# Patient Record
Sex: FEMALE | Race: WHITE | NOT HISPANIC OR LATINO | Employment: OTHER | URBAN - NONMETROPOLITAN AREA
[De-identification: names, ages, dates, MRNs, and addresses within clinical notes are randomized per-mention and may not be internally consistent; named-entity substitution may affect disease eponyms.]

---

## 2017-01-16 ENCOUNTER — OFFICE VISIT (OUTPATIENT)
Dept: FAMILY MEDICINE CLINIC | Facility: CLINIC | Age: 52
End: 2017-01-16

## 2017-01-16 VITALS
DIASTOLIC BLOOD PRESSURE: 80 MMHG | HEART RATE: 85 BPM | BODY MASS INDEX: 33.67 KG/M2 | SYSTOLIC BLOOD PRESSURE: 120 MMHG | OXYGEN SATURATION: 97 % | WEIGHT: 167 LBS | HEIGHT: 59 IN

## 2017-01-16 DIAGNOSIS — E11.9 TYPE 2 DIABETES MELLITUS WITHOUT COMPLICATION, WITHOUT LONG-TERM CURRENT USE OF INSULIN (HCC): Primary | ICD-10-CM

## 2017-01-16 DIAGNOSIS — R05.9 COUGH: ICD-10-CM

## 2017-01-16 DIAGNOSIS — S99.922A TOE INJURY, LEFT, INITIAL ENCOUNTER: ICD-10-CM

## 2017-01-16 LAB — HBA1C MFR BLD: 5.9 %

## 2017-01-16 PROCEDURE — 83036 HEMOGLOBIN GLYCOSYLATED A1C: CPT | Performed by: FAMILY MEDICINE

## 2017-01-16 PROCEDURE — 99214 OFFICE O/P EST MOD 30 MIN: CPT | Performed by: FAMILY MEDICINE

## 2017-01-16 RX ORDER — PROMETHAZINE HYDROCHLORIDE AND CODEINE PHOSPHATE 6.25; 1 MG/5ML; MG/5ML
5 SYRUP ORAL EVERY 4 HOURS PRN
Qty: 180 ML | Refills: 1
Start: 2017-01-16 | End: 2017-04-17

## 2017-01-16 NOTE — PROGRESS NOTES
Subjective   Key Webster is a 51 y.o. female.     History of Present Illness   51-year-old female.  Diabetic.  Here for 3 month follow-up visit.  Through the holidays her weights been pretty steady.  Her A1c in the past was 6.6, then she dropped it to 5.8.  At her last visit her metformin was dropped from 500 twice a day to 500 mg one half twice a day.  Her blood pressure was excellent and she was having some cough and her lisinopril was held.  She does take atorvastatin inconsistently.    Cough.  Just starting.  Some head congestion.  No fever.    Injury to her toe.  Left foot middle toe.  She would like for me to examine.  The following portions of the patient's history were reviewed and updated as appropriate: allergies, current medications, past family history, past medical history, past social history, past surgical history and problem list.    Review of Systems   Constitutional: Negative for activity change and unexpected weight change.   HENT: Positive for congestion.    Eyes: Negative for itching.   Respiratory: Positive for cough.    Cardiovascular: Negative for chest pain.   Musculoskeletal: Positive for arthralgias (in the middle toe of the left foot.).       Objective   Physical Exam   Constitutional: She appears well-nourished.   HENT:   Head: Normocephalic.   Neck: Neck supple.   Cardiovascular: Normal rate and regular rhythm.    Pulmonary/Chest: Effort normal and breath sounds normal.            Assessment/Plan   Key was seen today for diabetes.    Diagnoses and all orders for this visit:    Type 2 diabetes mellitus without complication, without long-term current use of insulin  -     POC Glycosylated Hemoglobin (Hb A1C)    Cough  -     promethazine-codeine (PHENERGAN with CODEINE) 6.25-10 MG/5ML syrup; Take 5 mL by mouth Every 4 (Four) Hours As Needed for cough.    Toe injury, left, initial encounter      Diabetes.  A1c is 59.  Overall I am pleased.  Continue metformin 500 mg one half twice a day.   Repeat A1c in 3 months.  Off off lisinopril.  Blood pressure still looks fine.  For the time being we'll not add losartan.    Cough.  Exam unremarkable.  Early respiratory infection.  Viral.  I did give her a cough medicine upon request.    Toe injury.  I doubt if were dealing with a fracture.  Everything is very straight.  I showed her how to karthik tape it.  No x-rays indicated at this time.

## 2017-01-16 NOTE — MR AVS SNAPSHOT
Key Webster   1/16/2017 4:00 PM   Office Visit    Dept Phone:  [phone]   Encounter #:  [id number]    Provider:  Lorenzo Tadeo MD   Department:  [facility]                Your Full Care Plan              Today's Medication Changes          These changes are accurate as of: 1/16/17  4:25 PM.  If you have any questions, ask your nurse or doctor.               New Medication(s)Ordered:     promethazine-codeine 6.25-10 MG/5ML syrup   Commonly known as:  PHENERGAN with CODEINE   Take 5 mL by mouth Every 4 (Four) Hours As Needed for cough.   Started by:  Lorenzo Tadeo MD            Where to Get Your Medications      Information about where to get these medications is not yet available     ! Ask your nurse or doctor about these medications     promethazine-codeine 6.25-10 MG/5ML syrup                  Your Updated Medication List          This list is accurate as of: 1/16/17  4:25 PM.  Always use your most recent med list.                atorvastatin 10 MG tablet   Commonly known as:  LIPITOR   Take 1 tablet by mouth daily.       lisinopril 10 MG tablet   Commonly known as:  PRINIVIL,ZESTRIL   Take 1 tablet by mouth daily.       metFORMIN 500 MG tablet   Commonly known as:  GLUCOPHAGE   Take 1 tablet by mouth 2 (two) times a day with meals.       promethazine-codeine 6.25-10 MG/5ML syrup   Commonly known as:  PHENERGAN with CODEINE   Take 5 mL by mouth Every 4 (Four) Hours As Needed for cough.               You Were Diagnosed With        Codes Comments    Type 2 diabetes mellitus without complication, without long-term current use of insulin    -  Primary ICD-10-CM: E11.9  ICD-9-CM: 250.00     Cough     ICD-10-CM: R05  ICD-9-CM: 786.2       Instructions     None    Patient Instructions History      Upcoming Appointments     Visit Type Date Time Department    FOLLOW UP 1/16/2017  4:00 PM [facility]      MyChart Signup     Our records indicate  "that you have declined [facility] sign[facility]. If you would like to sign up for [facility], please email [email or web address] or call [phone] to obtain an activation code.             Other Info from Your Visit           Allergies     Femstat [Butoconazole]        Reason for Visit     Diabetes check A1c      Vital Signs     Blood Pressure Pulse Height Weight Oxygen Saturation Body Mass Index    120/80 (BP Location: Left arm, Patient Position: Sitting) 85 59\" (149.9 cm) 167 lb (75.8 kg) 97% 33.73 kg/m2    Smoking Status                   Never Smoker           Problems and Diagnoses Noted     Diabetes    Cough            "

## 2017-01-27 ENCOUNTER — TRANSCRIBE ORDERS (OUTPATIENT)
Dept: ADMINISTRATIVE | Facility: HOSPITAL | Age: 52
End: 2017-01-27

## 2017-01-27 DIAGNOSIS — Z12.31 VISIT FOR SCREENING MAMMOGRAM: Primary | ICD-10-CM

## 2017-03-09 ENCOUNTER — HOSPITAL ENCOUNTER (OUTPATIENT)
Dept: MAMMOGRAPHY | Facility: HOSPITAL | Age: 52
Discharge: HOME | End: 2017-03-09
Attending: OBSTETRICS & GYNECOLOGY | Admitting: OBSTETRICS & GYNECOLOGY

## 2017-03-09 DIAGNOSIS — Z12.31 VISIT FOR SCREENING MAMMOGRAM: ICD-10-CM

## 2017-03-09 PROCEDURE — 77063 BREAST TOMOSYNTHESIS BI: CPT

## 2017-03-09 PROCEDURE — 77067 SCR MAMMO BI INCL CAD: CPT | Performed by: RADIOLOGY

## 2017-03-09 PROCEDURE — 77063 BREAST TOMOSYNTHESIS BI: CPT | Performed by: RADIOLOGY

## 2017-03-09 PROCEDURE — G0202 SCR MAMMO BI INCL CAD: HCPCS

## 2017-04-17 ENCOUNTER — OFFICE VISIT (OUTPATIENT)
Dept: FAMILY MEDICINE CLINIC | Facility: CLINIC | Age: 52
End: 2017-04-17

## 2017-04-17 VITALS
BODY MASS INDEX: 33.47 KG/M2 | HEART RATE: 77 BPM | HEIGHT: 59 IN | WEIGHT: 166 LBS | SYSTOLIC BLOOD PRESSURE: 130 MMHG | DIASTOLIC BLOOD PRESSURE: 80 MMHG | OXYGEN SATURATION: 98 %

## 2017-04-17 DIAGNOSIS — E11.9 TYPE 2 DIABETES MELLITUS WITHOUT COMPLICATION, WITHOUT LONG-TERM CURRENT USE OF INSULIN (HCC): Primary | ICD-10-CM

## 2017-04-17 LAB — HBA1C MFR BLD: 6 %

## 2017-04-17 PROCEDURE — 99213 OFFICE O/P EST LOW 20 MIN: CPT | Performed by: FAMILY MEDICINE

## 2017-04-17 PROCEDURE — 83036 HEMOGLOBIN GLYCOSYLATED A1C: CPT | Performed by: FAMILY MEDICINE

## 2017-04-17 NOTE — PROGRESS NOTES
Subjective   Key Webster is a 51 y.o. female.     History of Present Illness   51-year-old female.  Diabetic.  Last A1c was 5.8.  Had previously been much higher.  Weight is about the same.  Here for another A1c.  Currently taking metformin 500 mg one half twice a day, also a statin.  Lisinopril has been stopped due to a cough.  The following portions of the patient's history were reviewed and updated as appropriate: allergies, current medications, past family history, past medical history, past social history, past surgical history and problem list.    Review of Systems   Constitutional: Negative for activity change, fatigue and unexpected weight change.       Objective   Physical Exam   Constitutional: She is oriented to person, place, and time. She appears well-nourished.   HENT:   Head: Normocephalic.   Eyes: EOM are normal.   Pulmonary/Chest: Effort normal.   Neurological: She is alert and oriented to person, place, and time.   Skin: Skin is warm.   Psychiatric: She has a normal mood and affect.       Assessment/Plan   Diagnoses and all orders for this visit:    Type 2 diabetes mellitus without complication, without long-term current use of insulin  -     POC Glycosylated Hemoglobin (Hb A1C)    Diabetes.  A1c is now 6.0.  Last visit, 3 months ago, 5.8.  Has just returned from a [place] cruise.  I want her to continue her medicines the same.  Lengthy discussion about whether to try lisinopril again.  I have advised her not to.  If we do anything else start losartan.  We'll do this probably in 3 months when I do an annual exam.

## 2017-07-17 ENCOUNTER — LAB (OUTPATIENT)
Dept: FAMILY MEDICINE CLINIC | Facility: CLINIC | Age: 52
End: 2017-07-17

## 2017-07-17 DIAGNOSIS — I10 ESSENTIAL HYPERTENSION: ICD-10-CM

## 2017-07-17 DIAGNOSIS — Z00.00 ROUTINE GENERAL MEDICAL EXAMINATION AT A HEALTH CARE FACILITY: Primary | ICD-10-CM

## 2017-07-17 DIAGNOSIS — Z11.59 NEED FOR HEPATITIS C SCREENING TEST: ICD-10-CM

## 2017-07-17 DIAGNOSIS — E11.9 TYPE 2 DIABETES MELLITUS WITHOUT COMPLICATION, WITHOUT LONG-TERM CURRENT USE OF INSULIN (HCC): ICD-10-CM

## 2017-07-17 DIAGNOSIS — E78.5 HYPERLIPIDEMIA, UNSPECIFIED HYPERLIPIDEMIA TYPE: ICD-10-CM

## 2017-07-17 PROCEDURE — 36415 COLL VENOUS BLD VENIPUNCTURE: CPT | Performed by: NURSE PRACTITIONER

## 2017-07-18 LAB
ALBUMIN SERPL-MCNC: 4.5 G/DL (ref 3.5–5.5)
ALBUMIN/GLOB SERPL: 1.9 {RATIO} (ref 1.2–2.2)
ALP SERPL-CCNC: 91 IU/L (ref 39–117)
ALT SERPL-CCNC: 19 IU/L (ref 0–32)
AST SERPL-CCNC: 11 IU/L (ref 0–40)
BASOPHILS # BLD AUTO: 0 X10E3/UL (ref 0–0.2)
BASOPHILS NFR BLD AUTO: 0 %
BILIRUB SERPL-MCNC: <0.2 MG/DL (ref 0–1.2)
BUN SERPL-MCNC: 8 MG/DL (ref 6–24)
BUN/CREAT SERPL: 15 (ref 9–23)
CALCIUM SERPL-MCNC: 9.1 MG/DL (ref 8.7–10.2)
CHLORIDE SERPL-SCNC: 102 MMOL/L (ref 96–106)
CHOLEST SERPL-MCNC: 258 MG/DL (ref 100–199)
CO2 SERPL-SCNC: 21 MMOL/L (ref 18–29)
CREAT SERPL-MCNC: 0.52 MG/DL (ref 0.57–1)
EOSINOPHIL # BLD AUTO: 0.1 X10E3/UL (ref 0–0.4)
EOSINOPHIL NFR BLD AUTO: 1 %
ERYTHROCYTE [DISTWIDTH] IN BLOOD BY AUTOMATED COUNT: 13.6 % (ref 12.3–15.4)
GLOBULIN SER CALC-MCNC: 2.4 G/DL (ref 1.5–4.5)
GLUCOSE SERPL-MCNC: 98 MG/DL (ref 65–99)
HCT VFR BLD AUTO: 41.4 % (ref 34–46.6)
HCV AB S/CO SERPL IA: <0.1 S/CO RATIO (ref 0–0.9)
HDLC SERPL-MCNC: 37 MG/DL
HGB BLD-MCNC: 13.3 G/DL (ref 11.1–15.9)
IMM GRANULOCYTES # BLD: 0 X10E3/UL (ref 0–0.1)
IMM GRANULOCYTES NFR BLD: 0 %
LDLC SERPL CALC-MCNC: 152 MG/DL (ref 0–99)
LYMPHOCYTES # BLD AUTO: 2.6 X10E3/UL (ref 0.7–3.1)
LYMPHOCYTES NFR BLD AUTO: 40 %
MCH RBC QN AUTO: 30 PG (ref 26.6–33)
MCHC RBC AUTO-ENTMCNC: 32.1 G/DL (ref 31.5–35.7)
MCV RBC AUTO: 94 FL (ref 79–97)
MONOCYTES # BLD AUTO: 0.6 X10E3/UL (ref 0.1–0.9)
MONOCYTES NFR BLD AUTO: 9 %
NEUTROPHILS # BLD AUTO: 3.2 X10E3/UL (ref 1.4–7)
NEUTROPHILS NFR BLD AUTO: 50 %
PLATELET # BLD AUTO: 296 X10E3/UL (ref 150–379)
POTASSIUM SERPL-SCNC: 4.8 MMOL/L (ref 3.5–5.2)
PROT SERPL-MCNC: 6.9 G/DL (ref 6–8.5)
RBC # BLD AUTO: 4.43 X10E6/UL (ref 3.77–5.28)
SODIUM SERPL-SCNC: 144 MMOL/L (ref 134–144)
TRIGL SERPL-MCNC: 347 MG/DL (ref 0–149)
TSH SERPL DL<=0.005 MIU/L-ACNC: 1.73 UIU/ML (ref 0.45–4.5)
VLDLC SERPL CALC-MCNC: 69 MG/DL (ref 5–40)
WBC # BLD AUTO: 6.5 X10E3/UL (ref 3.4–10.8)

## 2017-07-21 ENCOUNTER — OFFICE VISIT (OUTPATIENT)
Dept: FAMILY MEDICINE CLINIC | Facility: CLINIC | Age: 52
End: 2017-07-21

## 2017-07-21 VITALS
BODY MASS INDEX: 32.86 KG/M2 | WEIGHT: 163 LBS | HEIGHT: 59 IN | SYSTOLIC BLOOD PRESSURE: 100 MMHG | DIASTOLIC BLOOD PRESSURE: 60 MMHG | OXYGEN SATURATION: 98 % | HEART RATE: 80 BPM

## 2017-07-21 DIAGNOSIS — E11.9 TYPE 2 DIABETES MELLITUS WITHOUT COMPLICATION, WITHOUT LONG-TERM CURRENT USE OF INSULIN (HCC): ICD-10-CM

## 2017-07-21 DIAGNOSIS — G47.33 OBSTRUCTIVE SLEEP APNEA: ICD-10-CM

## 2017-07-21 DIAGNOSIS — E78.5 HYPERLIPIDEMIA, UNSPECIFIED HYPERLIPIDEMIA TYPE: ICD-10-CM

## 2017-07-21 DIAGNOSIS — Z00.00 ANNUAL PHYSICAL EXAM: Primary | ICD-10-CM

## 2017-07-21 LAB
EXPIRATION DATE: NORMAL
HBA1C MFR BLD: 5.8 %
Lab: NORMAL

## 2017-07-21 PROCEDURE — 83036 HEMOGLOBIN GLYCOSYLATED A1C: CPT | Performed by: FAMILY MEDICINE

## 2017-07-21 PROCEDURE — 99396 PREV VISIT EST AGE 40-64: CPT | Performed by: FAMILY MEDICINE

## 2017-07-21 NOTE — PROGRESS NOTES
Subjective   Key Webster is a 52 y.o. female.     History of Present Illness   52-year-old female, diabetic.  No longer taking atorvastatin.  She was taking 10 mg 2-3 times per week but has not been taking anything this year.  She's here for a general annual exam regarding her diabetes.  Labs were drawn several days ago.  She voices no specific complaints.  Her last A1c was 6.0.  She has hired a  and is working out 2 times per week.    Hyperlipidemia old problem.  She is very curious to see if she has brought her lipids down on her own.    Sleep apnea.  Still sees Dr. Tory Mahajan on occasion.    Regarding other care, sees Dr. ramirez for her GYN care.  Mammogram up-to-date.  The following portions of the patient's history were reviewed and updated as appropriate: allergies, current medications, past family history, past medical history, past social history, past surgical history and problem list.    Review of Systems   Constitutional: Negative for appetite change, fatigue, fever and unexpected weight change.   HENT: Negative for congestion, dental problem, ear discharge, ear pain, hearing loss, postnasal drip, rhinorrhea, sneezing, sore throat, trouble swallowing and voice change.    Eyes: Negative for discharge, redness, itching and visual disturbance.   Respiratory: Positive for apnea. Negative for cough, shortness of breath and wheezing.    Cardiovascular: Negative for chest pain, palpitations and leg swelling.   Gastrointestinal: Negative for abdominal distention, abdominal pain, blood in stool, constipation, diarrhea, nausea and vomiting.   Endocrine: Negative for cold intolerance, heat intolerance, polydipsia, polyphagia and polyuria.   Genitourinary: Negative for dysuria, frequency, genital sores, hematuria and urgency.   Musculoskeletal: Negative for arthralgias, back pain, gait problem, joint swelling, myalgias and neck pain.   Skin: Negative for rash.   Allergic/Immunologic: Negative for  environmental allergies and food allergies.   Neurological: Negative for dizziness, syncope, weakness, light-headedness and headaches.   Hematological: Negative for adenopathy. Does not bruise/bleed easily.   Psychiatric/Behavioral: Negative for decreased concentration, dysphoric mood and sleep disturbance. The patient is not nervous/anxious.        Objective   Physical Exam   Constitutional: She is oriented to person, place, and time. She appears well-developed and well-nourished.   HENT:   Right Ear: External ear normal.   Left Ear: External ear normal.   Nose: Nose normal.   Mouth/Throat: Oropharynx is clear and moist.   Eyes: Conjunctivae and EOM are normal.   Neck: Normal range of motion. Neck supple. No thyromegaly present.   Cardiovascular: Normal rate, regular rhythm, normal heart sounds and intact distal pulses.    No murmur heard.  Pulmonary/Chest: Effort normal and breath sounds normal.   Abdominal: Soft. Bowel sounds are normal. She exhibits no mass. There is no tenderness.   Musculoskeletal: Normal range of motion. She exhibits no edema.    Key had a diabetic foot exam performed today.   During the foot exam she had a monofilament test performed.    Vascular Status -  Her exam exhibits right foot vasculature normal. Her exam exhibits no right foot edema. Her exam exhibits left foot vasculature normal. Her exam exhibits no left foot edema.   Skin Integrity  -  Her right foot skin is intact.     Key 's left foot skin is intact. .  Lymphadenopathy:     She has no cervical adenopathy.     She has no axillary adenopathy.        Right: No inguinal and no supraclavicular adenopathy present.        Left: No inguinal and no supraclavicular adenopathy present.   Neurological: She is alert and oriented to person, place, and time. Coordination normal.   Skin: Skin is warm and dry. No rash noted. No pallor.   Psychiatric: She has a normal mood and affect. Her behavior is normal. Thought content normal.   Vitals  reviewed.      Assessment/Plan   Key was seen today for annual exam.    Diagnoses and all orders for this visit:    Type 2 diabetes mellitus without complication, without long-term current use of insulin    Hyperlipidemia, unspecified hyperlipidemia type    Obstructive sleep apnea    Other orders  -     metFORMIN (GLUCOPHAGE) 500 MG tablet; Take 1 tablet by mouth 2 (Two) Times a Day With Meals.      Diabetes labs were drawn earlier and are reviewed.  Overall things look pretty good.  A1c today is 5.8 which is great.  She will continue to try to lose weight and diet.  She's been working with a  twice a week.  We'll follow-up with another A1c in 6 months.  Key has been taking metformin 500 mg but cutting in half and taking one half twice a day.  I do not object to this.  She promises that she will be getting her diabetic eye exam.  Declines a pneumococcal vaccine.    Hyperlipidemia.  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  Has not been taking a statin.  When we reviewed her lipid panel she was thrilled at how well she had brought it down on her own.  I do want her to try going back on atorvastatin 10 mg.  Daily would be nice but even 2-3 times a week is acceptable.  I think anything will help.    Sleep apnea.  Sees Dr. Mahajan.  Bala.    Wellness.  Mammogram is up-to-date.  As well as Pap smears.  Colonoscopy in 2016.  Declines a tetanus states that she is allergic to tetanus.  Hepatitis C has been drawn.  Actively engaged in a diet as well as having a .

## 2017-10-11 ENCOUNTER — LAB REQUISITION (OUTPATIENT)
Dept: LAB | Facility: HOSPITAL | Age: 52
End: 2017-10-11

## 2017-10-11 DIAGNOSIS — N89.8 OTHER SPECIFIED NONINFLAMMATORY DISORDERS OF VAGINA (CODE): ICD-10-CM

## 2017-10-11 PROCEDURE — 87070 CULTURE OTHR SPECIMN AEROBIC: CPT | Performed by: OBSTETRICS & GYNECOLOGY

## 2017-10-11 PROCEDURE — 87075 CULTR BACTERIA EXCEPT BLOOD: CPT | Performed by: OBSTETRICS & GYNECOLOGY

## 2017-10-11 PROCEDURE — 88305 TISSUE EXAM BY PATHOLOGIST: CPT | Performed by: OBSTETRICS & GYNECOLOGY

## 2017-10-12 LAB
CYTO UR: NORMAL
LAB AP CASE REPORT: NORMAL
LAB AP CLINICAL INFORMATION: NORMAL
Lab: NORMAL
PATH REPORT.FINAL DX SPEC: NORMAL
PATH REPORT.GROSS SPEC: NORMAL

## 2017-10-15 LAB — BACTERIA SPEC AEROBE CULT: NORMAL

## 2017-10-18 LAB — BACTERIA SPEC ANAEROBE CULT: NORMAL

## 2018-02-02 ENCOUNTER — TRANSCRIBE ORDERS (OUTPATIENT)
Dept: ADMINISTRATIVE | Facility: HOSPITAL | Age: 53
End: 2018-02-02

## 2018-02-02 DIAGNOSIS — Z12.31 VISIT FOR SCREENING MAMMOGRAM: Primary | ICD-10-CM

## 2018-03-16 ENCOUNTER — HOSPITAL ENCOUNTER (OUTPATIENT)
Dept: MAMMOGRAPHY | Facility: HOSPITAL | Age: 53
Discharge: HOME | End: 2018-03-16
Attending: OBSTETRICS & GYNECOLOGY | Admitting: OBSTETRICS & GYNECOLOGY

## 2018-03-16 DIAGNOSIS — Z12.31 VISIT FOR SCREENING MAMMOGRAM: ICD-10-CM

## 2018-03-16 PROCEDURE — 77067 SCR MAMMO BI INCL CAD: CPT | Performed by: RADIOLOGY

## 2018-03-16 PROCEDURE — 77063 BREAST TOMOSYNTHESIS BI: CPT

## 2018-03-16 PROCEDURE — 77067 SCR MAMMO BI INCL CAD: CPT

## 2018-03-16 PROCEDURE — 77063 BREAST TOMOSYNTHESIS BI: CPT | Performed by: RADIOLOGY

## 2018-04-03 ENCOUNTER — TRANSCRIBE ORDERS (OUTPATIENT)
Dept: MAMMOGRAPHY | Facility: HOSPITAL | Age: 53
End: 2018-04-03

## 2018-04-03 ENCOUNTER — HOSPITAL ENCOUNTER (OUTPATIENT)
Dept: ULTRASOUND IMAGING | Facility: HOSPITAL | Age: 53
Discharge: HOME | End: 2018-04-03

## 2018-04-03 ENCOUNTER — HOSPITAL ENCOUNTER (OUTPATIENT)
Dept: MAMMOGRAPHY | Facility: HOSPITAL | Age: 53
Discharge: HOME | End: 2018-04-03
Admitting: OBSTETRICS & GYNECOLOGY

## 2018-04-03 DIAGNOSIS — R92.8 ABNORMAL MAMMOGRAM: ICD-10-CM

## 2018-04-03 DIAGNOSIS — R92.8 ABNORMAL MAMMOGRAM: Primary | ICD-10-CM

## 2018-04-03 PROCEDURE — 77066 DX MAMMO INCL CAD BI: CPT

## 2018-04-03 PROCEDURE — 77062 BREAST TOMOSYNTHESIS BI: CPT | Performed by: RADIOLOGY

## 2018-04-03 PROCEDURE — 77066 DX MAMMO INCL CAD BI: CPT | Performed by: RADIOLOGY

## 2018-04-03 PROCEDURE — 76642 ULTRASOUND BREAST LIMITED: CPT

## 2018-04-03 PROCEDURE — G0279 TOMOSYNTHESIS, MAMMO: HCPCS

## 2018-04-03 PROCEDURE — 76642 ULTRASOUND BREAST LIMITED: CPT | Performed by: RADIOLOGY

## 2018-10-05 ENCOUNTER — HOSPITAL ENCOUNTER (OUTPATIENT)
Dept: MAMMOGRAPHY | Facility: HOSPITAL | Age: 53
Discharge: HOME | End: 2018-10-05
Attending: OBSTETRICS & GYNECOLOGY | Admitting: OBSTETRICS & GYNECOLOGY

## 2018-10-05 ENCOUNTER — TRANSCRIBE ORDERS (OUTPATIENT)
Dept: MAMMOGRAPHY | Facility: HOSPITAL | Age: 53
End: 2018-10-05

## 2018-10-05 ENCOUNTER — HOSPITAL ENCOUNTER (OUTPATIENT)
Dept: ULTRASOUND IMAGING | Facility: HOSPITAL | Age: 53
Discharge: HOME | End: 2018-10-05

## 2018-10-05 DIAGNOSIS — R92.8 ABNORMAL MAMMOGRAM: ICD-10-CM

## 2018-10-05 DIAGNOSIS — R92.8 ABNORMAL MAMMOGRAM: Primary | ICD-10-CM

## 2018-10-05 PROCEDURE — 77066 DX MAMMO INCL CAD BI: CPT | Performed by: RADIOLOGY

## 2018-10-05 PROCEDURE — 77062 BREAST TOMOSYNTHESIS BI: CPT | Performed by: RADIOLOGY

## 2018-10-05 PROCEDURE — 77066 DX MAMMO INCL CAD BI: CPT

## 2018-10-05 PROCEDURE — 76642 ULTRASOUND BREAST LIMITED: CPT

## 2018-10-05 PROCEDURE — 76642 ULTRASOUND BREAST LIMITED: CPT | Performed by: RADIOLOGY

## 2019-03-08 ENCOUNTER — HOSPITAL ENCOUNTER (OUTPATIENT)
Dept: MRI IMAGING | Facility: HOSPITAL | Age: 54
Discharge: HOME | End: 2019-03-08
Admitting: OBSTETRICS & GYNECOLOGY

## 2019-03-08 DIAGNOSIS — Z80.3 FAMILY HISTORY OF MALIGNANT NEOPLASM OF BREAST: ICD-10-CM

## 2019-03-08 LAB — CREAT BLDA-MCNC: 0.6 MG/DL (ref 0.6–1.3)

## 2019-03-08 PROCEDURE — 0 GADOBENATE DIMEGLUMINE 529 MG/ML SOLUTION: Performed by: OBSTETRICS & GYNECOLOGY

## 2019-03-08 PROCEDURE — A9577 INJ MULTIHANCE: HCPCS | Performed by: OBSTETRICS & GYNECOLOGY

## 2019-03-08 PROCEDURE — 82565 ASSAY OF CREATININE: CPT

## 2019-03-08 PROCEDURE — C8908 MRI W/O FOL W/CONT, BREAST,: HCPCS

## 2019-03-08 PROCEDURE — 77049 MRI BREAST C-+ W/CAD BI: CPT | Performed by: RADIOLOGY

## 2019-03-08 RX ADMIN — GADOBENATE DIMEGLUMINE 15 ML: 529 INJECTION, SOLUTION INTRAVENOUS at 09:40

## 2019-03-11 ENCOUNTER — TELEPHONE (OUTPATIENT)
Dept: MRI IMAGING | Facility: HOSPITAL | Age: 54
End: 2019-03-11

## 2019-03-11 NOTE — TELEPHONE ENCOUNTER
Called pt with Breast MRI results. Recommended 2nd Look US scheduled for Wednesday March 13 at 2:00 at the [address] [facility], to arrive 20 minutes early to register. Advised to bring a . Pt not on blood thinners. Pt expressed understanding and was encouraged to call with any further questions or concerns,

## 2019-03-13 ENCOUNTER — HOSPITAL ENCOUNTER (OUTPATIENT)
Dept: MAMMOGRAPHY | Facility: HOSPITAL | Age: 54
Discharge: HOME | End: 2019-03-13

## 2019-03-13 ENCOUNTER — HOSPITAL ENCOUNTER (OUTPATIENT)
Dept: ULTRASOUND IMAGING | Facility: HOSPITAL | Age: 54
Discharge: HOME | End: 2019-03-13
Admitting: RADIOLOGY

## 2019-03-13 DIAGNOSIS — R92.8 ABNORMAL MAMMOGRAM: ICD-10-CM

## 2019-03-13 DIAGNOSIS — Z80.3 FAMILY HISTORY OF MALIGNANT NEOPLASM OF BREAST: ICD-10-CM

## 2019-03-13 DIAGNOSIS — R92.8 ABNORMAL MRI, BREAST: ICD-10-CM

## 2019-03-13 PROCEDURE — 19083 BX BREAST 1ST LESION US IMAG: CPT | Performed by: RADIOLOGY

## 2019-03-13 PROCEDURE — 88305 TISSUE EXAM BY PATHOLOGIST: CPT | Performed by: OBSTETRICS & GYNECOLOGY

## 2019-03-13 PROCEDURE — A4648 IMPLANTABLE TISSUE MARKER: HCPCS

## 2019-03-13 PROCEDURE — 77065 DX MAMMO INCL CAD UNI: CPT | Performed by: RADIOLOGY

## 2019-03-13 RX ORDER — LIDOCAINE HYDROCHLORIDE AND EPINEPHRINE 10; 10 MG/ML; UG/ML
10 INJECTION, SOLUTION INFILTRATION; PERINEURAL ONCE
Status: COMPLETED | OUTPATIENT
Start: 2019-03-13 | End: 2019-03-13

## 2019-03-13 RX ORDER — LIDOCAINE HYDROCHLORIDE 10 MG/ML
5 INJECTION, SOLUTION INFILTRATION; PERINEURAL ONCE
Status: COMPLETED | OUTPATIENT
Start: 2019-03-13 | End: 2019-03-13

## 2019-03-13 RX ADMIN — LIDOCAINE HYDROCHLORIDE,EPINEPHRINE BITARTRATE 2 ML: 10; .01 INJECTION, SOLUTION INFILTRATION; PERINEURAL at 15:34

## 2019-03-13 RX ADMIN — LIDOCAINE HYDROCHLORIDE 5 ML: 10 INJECTION, SOLUTION INFILTRATION; PERINEURAL at 15:33

## 2019-03-13 NOTE — PROGRESS NOTES
Alert and orientated. Denies discomfort. No active bleeding. Steri-strips intact, gauze dressing applied. Ice packs given. Verbalizes and demonstrates understanding of post-care instructions and written copy given.

## 2019-03-15 LAB
CYTO UR: NORMAL
LAB AP CASE REPORT: NORMAL
LAB AP CLINICAL INFORMATION: NORMAL
LAB AP DIAGNOSIS COMMENT: NORMAL
PATH REPORT.FINAL DX SPEC: NORMAL
PATH REPORT.GROSS SPEC: NORMAL

## 2019-03-18 ENCOUNTER — TELEPHONE (OUTPATIENT)
Dept: MAMMOGRAPHY | Facility: HOSPITAL | Age: 54
End: 2019-03-18

## 2019-03-18 NOTE — TELEPHONE ENCOUNTER
Pt notified of pathology results and recommendations. Verbalizes understanding. Denies discomfort. Denies signs and symptoms of infection. Pt desires to research surgeon choice. Pt is to call back with decision; an appointment will then be scheduled and she will be notified. Pt verbalized understanding.

## 2019-04-08 ENCOUNTER — TRANSCRIBE ORDERS (OUTPATIENT)
Dept: MAMMOGRAPHY | Facility: HOSPITAL | Age: 54
End: 2019-04-08

## 2019-04-08 DIAGNOSIS — N63.14 UNSPECIFIED LUMP IN THE RIGHT BREAST, LOWER INNER QUADRANT: Primary | ICD-10-CM

## 2019-04-12 ENCOUNTER — APPOINTMENT (OUTPATIENT)
Dept: MAMMOGRAPHY | Facility: HOSPITAL | Age: 54
End: 2019-04-12
Attending: OBSTETRICS & GYNECOLOGY

## 2019-05-03 ENCOUNTER — APPOINTMENT (OUTPATIENT)
Dept: PREADMISSION TESTING | Facility: HOSPITAL | Age: 54
End: 2019-05-03

## 2019-05-03 VITALS — BODY MASS INDEX: 31.53 KG/M2 | WEIGHT: 156.4 LBS | HEIGHT: 59 IN

## 2019-05-03 LAB
ANION GAP SERPL CALCULATED.3IONS-SCNC: 15 MMOL/L
BUN BLD-MCNC: 9 MG/DL (ref 6–20)
BUN/CREAT SERPL: 15 (ref 7–25)
CALCIUM SPEC-SCNC: 9.5 MG/DL (ref 8.6–10.5)
CHLORIDE SERPL-SCNC: 101 MMOL/L (ref 98–107)
CO2 SERPL-SCNC: 25 MMOL/L (ref 22–29)
CREAT BLD-MCNC: 0.6 MG/DL (ref 0.57–1)
DEPRECATED RDW RBC AUTO: 47.2 FL (ref 37–54)
ERYTHROCYTE [DISTWIDTH] IN BLOOD BY AUTOMATED COUNT: 13.7 % (ref 12.3–15.4)
GFR SERPL CREATININE-BSD FRML MDRD: 104 ML/MIN/1.73
GLUCOSE BLD-MCNC: 139 MG/DL (ref 65–99)
HCT VFR BLD AUTO: 44 % (ref 34–46.6)
HGB BLD-MCNC: 14.2 G/DL (ref 12–15.9)
MCH RBC QN AUTO: 30.4 PG (ref 26.6–33)
MCHC RBC AUTO-ENTMCNC: 32.3 G/DL (ref 31.5–35.7)
MCV RBC AUTO: 94.2 FL (ref 79–97)
PLATELET # BLD AUTO: 281 10*3/MM3 (ref 140–450)
PMV BLD AUTO: 9.2 FL (ref 6–12)
POTASSIUM BLD-SCNC: 4.3 MMOL/L (ref 3.5–5.2)
RBC # BLD AUTO: 4.67 10*6/MM3 (ref 3.77–5.28)
SODIUM BLD-SCNC: 141 MMOL/L (ref 136–145)
WBC NRBC COR # BLD: 6.37 10*3/MM3 (ref 3.4–10.8)

## 2019-05-03 PROCEDURE — 36415 COLL VENOUS BLD VENIPUNCTURE: CPT

## 2019-05-03 PROCEDURE — 93010 ELECTROCARDIOGRAM REPORT: CPT | Performed by: INTERNAL MEDICINE

## 2019-05-03 PROCEDURE — 80048 BASIC METABOLIC PNL TOTAL CA: CPT | Performed by: SURGERY

## 2019-05-03 PROCEDURE — 93005 ELECTROCARDIOGRAM TRACING: CPT

## 2019-05-03 PROCEDURE — 85027 COMPLETE CBC AUTOMATED: CPT | Performed by: ANESTHESIOLOGY

## 2019-05-07 ENCOUNTER — ANESTHESIA (OUTPATIENT)
Dept: PERIOP | Facility: HOSPITAL | Age: 54
End: 2019-05-07

## 2019-05-07 ENCOUNTER — HOSPITAL ENCOUNTER (OUTPATIENT)
Dept: MAMMOGRAPHY | Facility: HOSPITAL | Age: 54
Discharge: HOME | End: 2019-05-07

## 2019-05-07 ENCOUNTER — HOSPITAL ENCOUNTER (OUTPATIENT)
Facility: HOSPITAL | Age: 54
Setting detail: HOSPITAL OUTPATIENT SURGERY
Discharge: HOME | End: 2019-05-07
Attending: SURGERY | Admitting: SURGERY

## 2019-05-07 ENCOUNTER — ANESTHESIA EVENT (OUTPATIENT)
Dept: PERIOP | Facility: HOSPITAL | Age: 54
End: 2019-05-07

## 2019-05-07 VITALS
RESPIRATION RATE: 16 BRPM | OXYGEN SATURATION: 98 % | TEMPERATURE: 97.8 F | HEART RATE: 74 BPM | SYSTOLIC BLOOD PRESSURE: 139 MMHG | DIASTOLIC BLOOD PRESSURE: 78 MMHG

## 2019-05-07 DIAGNOSIS — N63.14 UNSPECIFIED LUMP IN THE RIGHT BREAST, LOWER INNER QUADRANT: ICD-10-CM

## 2019-05-07 LAB
B-HCG UR QL: NEGATIVE
INTERNAL NEGATIVE CONTROL: NORMAL
INTERNAL POSITIVE CONTROL: POSITIVE
Lab: NORMAL

## 2019-05-07 PROCEDURE — 88307 TISSUE EXAM BY PATHOLOGIST: CPT | Performed by: SURGERY

## 2019-05-07 PROCEDURE — 81025 URINE PREGNANCY TEST: CPT | Performed by: SURGERY

## 2019-05-07 PROCEDURE — 25010000002 FENTANYL CITRATE (PF) 100 MCG/2ML SOLUTION: Performed by: NURSE ANESTHETIST, CERTIFIED REGISTERED

## 2019-05-07 PROCEDURE — 19281 PERQ DEVICE BREAST 1ST IMAG: CPT | Performed by: RADIOLOGY

## 2019-05-07 PROCEDURE — 25010000002 ONDANSETRON PER 1 MG: Performed by: NURSE ANESTHETIST, CERTIFIED REGISTERED

## 2019-05-07 PROCEDURE — 25010000002 PROPOFOL 10 MG/ML EMULSION: Performed by: NURSE ANESTHETIST, CERTIFIED REGISTERED

## 2019-05-07 PROCEDURE — 25010000002 DEXAMETHASONE PER 1 MG: Performed by: NURSE ANESTHETIST, CERTIFIED REGISTERED

## 2019-05-07 PROCEDURE — 76098 X-RAY EXAM SURGICAL SPECIMEN: CPT | Performed by: RADIOLOGY

## 2019-05-07 PROCEDURE — 76098 X-RAY EXAM SURGICAL SPECIMEN: CPT

## 2019-05-07 RX ORDER — LIDOCAINE HYDROCHLORIDE 10 MG/ML
5 INJECTION, SOLUTION INFILTRATION; PERINEURAL ONCE
Status: DISCONTINUED | OUTPATIENT
Start: 2019-05-07 | End: 2019-05-07 | Stop reason: HOSPADM

## 2019-05-07 RX ORDER — DEXAMETHASONE SODIUM PHOSPHATE 4 MG/ML
INJECTION, SOLUTION INTRA-ARTICULAR; INTRALESIONAL; INTRAMUSCULAR; INTRAVENOUS; SOFT TISSUE AS NEEDED
Status: DISCONTINUED | OUTPATIENT
Start: 2019-05-07 | End: 2019-05-07 | Stop reason: SURG

## 2019-05-07 RX ORDER — ONDANSETRON 2 MG/ML
INJECTION INTRAMUSCULAR; INTRAVENOUS AS NEEDED
Status: DISCONTINUED | OUTPATIENT
Start: 2019-05-07 | End: 2019-05-07 | Stop reason: SURG

## 2019-05-07 RX ORDER — SODIUM CHLORIDE 0.9 % (FLUSH) 0.9 %
3 SYRINGE (ML) INJECTION EVERY 12 HOURS SCHEDULED
Status: DISCONTINUED | OUTPATIENT
Start: 2019-05-07 | End: 2019-05-07 | Stop reason: HOSPADM

## 2019-05-07 RX ORDER — LIDOCAINE HYDROCHLORIDE 10 MG/ML
INJECTION, SOLUTION EPIDURAL; INFILTRATION; INTRACAUDAL; PERINEURAL AS NEEDED
Status: DISCONTINUED | OUTPATIENT
Start: 2019-05-07 | End: 2019-05-07 | Stop reason: SURG

## 2019-05-07 RX ORDER — BUPIVACAINE HYDROCHLORIDE 5 MG/ML
INJECTION, SOLUTION EPIDURAL; INTRACAUDAL AS NEEDED
Status: DISCONTINUED | OUTPATIENT
Start: 2019-05-07 | End: 2019-05-07 | Stop reason: HOSPADM

## 2019-05-07 RX ORDER — SODIUM CHLORIDE 0.9 % (FLUSH) 0.9 %
3-10 SYRINGE (ML) INJECTION AS NEEDED
Status: DISCONTINUED | OUTPATIENT
Start: 2019-05-07 | End: 2019-05-07 | Stop reason: HOSPADM

## 2019-05-07 RX ORDER — TRAMADOL HYDROCHLORIDE 50 MG/1
50 TABLET ORAL EVERY 6 HOURS PRN
Qty: 10 TABLET | Refills: 0 | Status: SHIPPED | OUTPATIENT
Start: 2019-05-07 | End: 2020-05-06

## 2019-05-07 RX ORDER — LIDOCAINE HYDROCHLORIDE 10 MG/ML
0.5 INJECTION, SOLUTION EPIDURAL; INFILTRATION; INTRACAUDAL; PERINEURAL ONCE AS NEEDED
Status: COMPLETED | OUTPATIENT
Start: 2019-05-07 | End: 2019-05-07

## 2019-05-07 RX ORDER — CHLORAL HYDRATE 500 MG
CAPSULE ORAL
COMMUNITY

## 2019-05-07 RX ORDER — POLYETHYLENE GLYCOL 3350 17 G/17G
17 POWDER, FOR SOLUTION ORAL AS NEEDED
COMMUNITY

## 2019-05-07 RX ORDER — SODIUM CHLORIDE, SODIUM LACTATE, POTASSIUM CHLORIDE, CALCIUM CHLORIDE 600; 310; 30; 20 MG/100ML; MG/100ML; MG/100ML; MG/100ML
9 INJECTION, SOLUTION INTRAVENOUS CONTINUOUS PRN
Status: DISCONTINUED | OUTPATIENT
Start: 2019-05-07 | End: 2019-05-07 | Stop reason: HOSPADM

## 2019-05-07 RX ORDER — FENTANYL CITRATE 50 UG/ML
INJECTION, SOLUTION INTRAMUSCULAR; INTRAVENOUS AS NEEDED
Status: DISCONTINUED | OUTPATIENT
Start: 2019-05-07 | End: 2019-05-07 | Stop reason: SURG

## 2019-05-07 RX ORDER — HYDROMORPHONE HYDROCHLORIDE 1 MG/ML
0.5 INJECTION, SOLUTION INTRAMUSCULAR; INTRAVENOUS; SUBCUTANEOUS
Status: DISCONTINUED | OUTPATIENT
Start: 2019-05-07 | End: 2019-05-07 | Stop reason: HOSPADM

## 2019-05-07 RX ORDER — FAMOTIDINE 20 MG/1
20 TABLET, FILM COATED ORAL
Status: COMPLETED | OUTPATIENT
Start: 2019-05-07 | End: 2019-05-07

## 2019-05-07 RX ORDER — MAGNESIUM HYDROXIDE 1200 MG/15ML
LIQUID ORAL AS NEEDED
Status: DISCONTINUED | OUTPATIENT
Start: 2019-05-07 | End: 2019-05-07 | Stop reason: HOSPADM

## 2019-05-07 RX ORDER — FENTANYL CITRATE 50 UG/ML
50 INJECTION, SOLUTION INTRAMUSCULAR; INTRAVENOUS
Status: DISCONTINUED | OUTPATIENT
Start: 2019-05-07 | End: 2019-05-07 | Stop reason: HOSPADM

## 2019-05-07 RX ORDER — PROPOFOL 10 MG/ML
VIAL (ML) INTRAVENOUS AS NEEDED
Status: DISCONTINUED | OUTPATIENT
Start: 2019-05-07 | End: 2019-05-07 | Stop reason: SURG

## 2019-05-07 RX ADMIN — LIDOCAINE HYDROCHLORIDE 50 MG: 10 INJECTION, SOLUTION EPIDURAL; INFILTRATION; INTRACAUDAL; PERINEURAL at 12:21

## 2019-05-07 RX ADMIN — PROPOFOL 200 MG: 10 INJECTION, EMULSION INTRAVENOUS at 12:21

## 2019-05-07 RX ADMIN — SODIUM CHLORIDE, POTASSIUM CHLORIDE, SODIUM LACTATE AND CALCIUM CHLORIDE 9 ML/HR: 600; 310; 30; 20 INJECTION, SOLUTION INTRAVENOUS at 10:30

## 2019-05-07 RX ADMIN — SODIUM CHLORIDE, POTASSIUM CHLORIDE, SODIUM LACTATE AND CALCIUM CHLORIDE: 600; 310; 30; 20 INJECTION, SOLUTION INTRAVENOUS at 12:17

## 2019-05-07 RX ADMIN — ONDANSETRON 4 MG: 2 INJECTION INTRAMUSCULAR; INTRAVENOUS at 12:48

## 2019-05-07 RX ADMIN — FAMOTIDINE 20 MG: 20 TABLET ORAL at 10:33

## 2019-05-07 RX ADMIN — FENTANYL CITRATE 50 MCG: 50 INJECTION, SOLUTION INTRAMUSCULAR; INTRAVENOUS at 12:21

## 2019-05-07 RX ADMIN — LIDOCAINE HYDROCHLORIDE 3 ML: 10 INJECTION, SOLUTION INFILTRATION; PERINEURAL at 08:27

## 2019-05-07 RX ADMIN — DEXAMETHASONE SODIUM PHOSPHATE 8 MG: 4 INJECTION, SOLUTION INTRAMUSCULAR; INTRAVENOUS at 12:21

## 2019-05-07 RX ADMIN — LIDOCAINE HYDROCHLORIDE 0.5 ML: 10 INJECTION, SOLUTION EPIDURAL; INFILTRATION; INTRACAUDAL; PERINEURAL at 10:30

## 2019-05-07 RX ADMIN — FENTANYL CITRATE 50 MCG: 50 INJECTION, SOLUTION INTRAMUSCULAR; INTRAVENOUS at 13:00

## 2019-05-07 NOTE — BRIEF OP NOTE
BREAST BIOPSY WITH NEEDLE LOCALIZATION  Progress Note    Key Webster  5/7/2019    Pre-op Diagnosis:   Right breast mass       Post-Op Diagnosis Codes:     * Subareolar mass of right breast [N63.41]    Procedure/CPT® Codes:      Procedure(s):  EXCISIONAL BIOPSY RIGHT BREAST WITH WIRE LOC    Surgeon(s):  Stan Clement MD    Anesthesia: General    Staff:   Circulator: Kendra Dominique RN; Nighat Douglas RN  Scrub Person: Shawn العراقي; Johnson Chau  Nursing Assistant: Esha Watts; Zoya Stanford CNA    Estimated Blood Loss: none    Urine Voided: * No values recorded between 5/7/2019 12:17 PM and 5/7/2019  1:05 PM *    Specimens:                Specimens     ID Source Type Tests Collected By Collected At Frozen?      A Breast, Right Tissue · TISSUE PATHOLOGY EXAM   Stan Clement MD 5/7/19 1236 No     Description: sent to breast center first                  Complications: none      Stan Clement MD     Date: 5/7/2019  Time: 1:10 PM

## 2019-05-07 NOTE — ANESTHESIA PREPROCEDURE EVALUATION
Anesthesia Evaluation     Patient summary reviewed and Nursing notes reviewed   no history of anesthetic complications:  NPO Solid Status: > 8 hours  NPO Liquid Status: > 2 hours           Airway   Mallampati: III  TM distance: >3 FB  Neck ROM: full  Possible difficult intubation  Dental - normal exam     Pulmonary    (+) sleep apnea on CPAP, decreased breath sounds,   Cardiovascular   Exercise tolerance: good (4-7 METS)    Rhythm: regular  Rate: normal    (+) hypertension well controlled less than 2 medications, hyperlipidemia,       Neuro/Psych  GI/Hepatic/Renal/Endo    (+) obesity,   diabetes mellitus type 2 well controlled,     Musculoskeletal     Abdominal   (+) obese,     Abdomen: soft.   Substance History      OB/GYN          Other   (+) arthritis                   Anesthesia Plan    ASA 3     general     intravenous induction   Anesthetic plan, all risks, benefits, and alternatives have been provided, discussed and informed consent has been obtained with: patient.    Plan discussed with CRNA.

## 2019-05-07 NOTE — OP NOTE
Operative Note    Date of Surgery:  5/7/2019    Pre-Operative Diagnosis: Right retroareolar papilloma    Post-Operative Diagnosis: Same    Procedure: Excisional biopsy of right retroareolar papilloma following needle localization by radiology    Anesthesia: General    Surgeon:  Stan Clement MD    Assistant: CATHERINE Vega    Estimated Blood Loss: Very minimal    Complications: None      Indication for Procedure: Mrs. Webster is a 53-year-old pleasant lady who presented with an abnormal right breast mammogram showing a 7 mm mass in the retroareolar region and core needle biopsy showing a papilloma.  She has a strong family history of breast cancer.  She presents today for excisional biopsy following needle localization by radiology.    Procedure: Patient was taken to the operating by anesthesia and placed supine on the table.  Following induction of general anesthesia using LMA, SCDs were placed.  The right breast was then prepped and draped in a sterile fashion.  Timeout was observed.  Marcaine 0.5% plain was administered in the periareolar region and a small incision was made between 2 and 4 o'clock position.  Dissection was carried to expose the 2 wires.  Those were transected at skin level.  The breast tissue around the tip of the wires was excised with the wires intact and specimen x-ray showed the clip in the middle of the specimen which was confirmed by radiology then sent to pathology for permanent section.  The wound was then irrigated with water with clear return of fluid noted.  The subcutaneous tissues were then approximated with interrupted 3-0 Vicryl sutures and the skin incision was approximated with 4-0 Monocryl subcuticular suture.  Steri-Strips and sterile dressing was applied.  The patient tolerated procedure well with no complications.  She was extubated and taken to recovery room in stable condition.  Sponge count and needle count were correct at the end of the procedure.    Stan GARCIA  MD [place]  05/07/19  2:42 PM

## 2019-05-07 NOTE — ANESTHESIA PROCEDURE NOTES
Airway  Urgency: elective      General Information and Staff    Patient location during procedure: OR  CRNA: Johnson Riley CRNA    Indications and Patient Condition  Indications for airway management: airway protection    Preoxygenated: yes      Final Airway Details  Final airway type: supraglottic airway      Successful airway: I-gel  Size 4    Number of attempts at approach: 1

## 2019-05-07 NOTE — ANESTHESIA POSTPROCEDURE EVALUATION
Patient: Key Webster    Procedure Summary     Date:  05/07/19 Room / Location:   [facility] /  [facility]    Anesthesia Start:  1217 Anesthesia Stop:      Procedure:  EXCISIONAL BIOPSY RIGHT BREAST WITH WIRE LOC (Right Breast) Diagnosis:      Surgeon:  Stan Clement MD Provider:  Neo Pacheco MD    Anesthesia Type:  general ASA Status:  3          Anesthesia Type: general  Last vitals  BP   123/78   Temp   98   Pulse   78   Resp   16     SpO2   99     Post Anesthesia Care and Evaluation    Patient location during evaluation: PACU  Patient participation: complete - patient participated  Level of consciousness: awake and responsive to verbal stimuli  Pain score: 2  Pain management: adequate  Airway patency: patent  Anesthetic complications: No anesthetic complications    Cardiovascular status: acceptable  Respiratory status: acceptable  Hydration status: acceptable    Comments: Pt awake and responsive. SV. VSS. Report to RN. Patient Vitals in the past 24 hrs:  05/07/19 1035, BP:140/77, Temp:98.8 °F (37.1 °C), Temp src:Temporal, Pulse:82, Resp:18, SpO2:97 %  133/78. p 72. r 16. t 98.1

## 2019-05-07 NOTE — INTERVAL H&P NOTE
[facility] Pre[facility]    Full history and physical note from office is up to date.  See office note attached.    /77 (BP Location: Right arm, Patient Position: Lying)   Pulse 82   Temp 98.8 °F (37.1 °C) (Temporal)   Resp 18   SpO2 97%   Breastfeeding? No     LAB Results:  Lab Results   Component Value Date    WBC 6.37 05/03/2019    HGB 14.2 05/03/2019    HCT 44.0 05/03/2019    MCV 94.2 05/03/2019     05/03/2019    NEUTROABS 3.2 07/17/2017    GLUCOSE 139 (H) 05/03/2019    BUN 9 05/03/2019    CREATININE 0.60 05/03/2019    EGFRIFNONA 104 05/03/2019    EGFRIFAFRI 125 07/17/2017     05/03/2019    K 4.3 05/03/2019     05/03/2019    CO2 25.0 05/03/2019    CALCIUM 9.5 05/03/2019    ALBUMIN 4.5 07/17/2017    AST 11 07/17/2017    ALT 19 07/17/2017    BILITOT <0.2 07/17/2017       Cancer Staging (if applicable)  Cancer Patient: __ yes __no _x_unknown__N/A; If yes, clinical stage T:__ N:__M:__, stage group or __N/A    Caryn Sheehan, WYATT 5/7/2019 12:01 PM

## 2019-05-10 LAB
CYTO UR: NORMAL
LAB AP CASE REPORT: NORMAL
LAB AP CLINICAL INFORMATION: NORMAL
PATH REPORT.FINAL DX SPEC: NORMAL
PATH REPORT.GROSS SPEC: NORMAL

## 2019-05-28 ENCOUNTER — TRANSCRIBE ORDERS (OUTPATIENT)
Dept: MAMMOGRAPHY | Facility: HOSPITAL | Age: 54
End: 2019-05-28

## 2019-05-28 DIAGNOSIS — R92.8 ABNORMAL MAMMOGRAM: Primary | ICD-10-CM

## 2019-05-28 DIAGNOSIS — N63.14 UNSPECIFIED LUMP IN THE RIGHT BREAST, LOWER INNER QUADRANT: Primary | ICD-10-CM

## 2019-11-08 ENCOUNTER — HOSPITAL ENCOUNTER (OUTPATIENT)
Dept: MAMMOGRAPHY | Facility: HOSPITAL | Age: 54
Discharge: HOME | End: 2019-11-08
Admitting: SURGERY

## 2019-11-08 DIAGNOSIS — N63.14 UNSPECIFIED LUMP IN THE RIGHT BREAST, LOWER INNER QUADRANT: ICD-10-CM

## 2019-11-08 PROCEDURE — G0279 TOMOSYNTHESIS, MAMMO: HCPCS

## 2019-11-08 PROCEDURE — 77062 BREAST TOMOSYNTHESIS BI: CPT | Performed by: RADIOLOGY

## 2019-11-08 PROCEDURE — 77066 DX MAMMO INCL CAD BI: CPT | Performed by: RADIOLOGY

## 2019-11-08 PROCEDURE — 77066 DX MAMMO INCL CAD BI: CPT

## 2020-08-12 ENCOUNTER — TRANSCRIBE ORDERS (OUTPATIENT)
Dept: ADMINISTRATIVE | Facility: HOSPITAL | Age: 55
End: 2020-08-12

## 2020-08-12 DIAGNOSIS — Z12.31 VISIT FOR SCREENING MAMMOGRAM: Primary | ICD-10-CM

## 2020-11-12 ENCOUNTER — HOSPITAL ENCOUNTER (OUTPATIENT)
Dept: MAMMOGRAPHY | Facility: HOSPITAL | Age: 55
Discharge: HOME | End: 2020-11-12
Admitting: OBSTETRICS & GYNECOLOGY

## 2020-11-12 DIAGNOSIS — Z12.31 VISIT FOR SCREENING MAMMOGRAM: ICD-10-CM

## 2020-11-12 PROCEDURE — 77067 SCR MAMMO BI INCL CAD: CPT | Performed by: RADIOLOGY

## 2020-11-12 PROCEDURE — 77063 BREAST TOMOSYNTHESIS BI: CPT | Performed by: RADIOLOGY

## 2020-11-12 PROCEDURE — 77067 SCR MAMMO BI INCL CAD: CPT

## 2020-11-12 PROCEDURE — 77063 BREAST TOMOSYNTHESIS BI: CPT

## 2021-07-28 ENCOUNTER — TELEPHONE (OUTPATIENT)
Dept: MRI IMAGING | Facility: HOSPITAL | Age: 56
End: 2021-07-28

## 2021-07-28 ENCOUNTER — TRANSCRIBE ORDERS (OUTPATIENT)
Dept: ADMINISTRATIVE | Facility: HOSPITAL | Age: 56
End: 2021-07-28

## 2021-07-28 DIAGNOSIS — Z12.31 VISIT FOR SCREENING MAMMOGRAM: Primary | ICD-10-CM

## 2021-07-28 NOTE — TELEPHONE ENCOUNTER
Called pt to schedule her Breast MRI and she is not interested at this time. She is due for her yearly MMG in Nov. 2021.0

## 2021-11-15 ENCOUNTER — HOSPITAL ENCOUNTER (OUTPATIENT)
Dept: MAMMOGRAPHY | Facility: HOSPITAL | Age: 56
Discharge: HOME | End: 2021-11-15

## 2021-11-15 ENCOUNTER — APPOINTMENT (OUTPATIENT)
Dept: MAMMOGRAPHY | Facility: HOSPITAL | Age: 56
End: 2021-11-15

## 2021-11-15 DIAGNOSIS — Z12.31 VISIT FOR SCREENING MAMMOGRAM: ICD-10-CM

## 2021-12-30 ENCOUNTER — HOSPITAL ENCOUNTER (OUTPATIENT)
Dept: MAMMOGRAPHY | Facility: HOSPITAL | Age: 56
Discharge: HOME | End: 2021-12-30
Admitting: OBSTETRICS & GYNECOLOGY

## 2021-12-30 PROCEDURE — 77063 BREAST TOMOSYNTHESIS BI: CPT

## 2021-12-30 PROCEDURE — 77063 BREAST TOMOSYNTHESIS BI: CPT | Performed by: RADIOLOGY

## 2021-12-30 PROCEDURE — 77067 SCR MAMMO BI INCL CAD: CPT

## 2021-12-30 PROCEDURE — 77067 SCR MAMMO BI INCL CAD: CPT | Performed by: RADIOLOGY

## 2022-12-09 ENCOUNTER — TRANSCRIBE ORDERS (OUTPATIENT)
Dept: ADMINISTRATIVE | Facility: HOSPITAL | Age: 57
End: 2022-12-09

## 2022-12-09 DIAGNOSIS — Z12.31 VISIT FOR SCREENING MAMMOGRAM: Primary | ICD-10-CM

## 2023-01-17 ENCOUNTER — HOSPITAL ENCOUNTER (OUTPATIENT)
Dept: MAMMOGRAPHY | Facility: HOSPITAL | Age: 58
Discharge: HOME | End: 2023-01-17
Admitting: OBSTETRICS & GYNECOLOGY
Payer: COMMERCIAL

## 2023-01-17 DIAGNOSIS — Z12.31 VISIT FOR SCREENING MAMMOGRAM: ICD-10-CM

## 2023-01-17 PROCEDURE — 77063 BREAST TOMOSYNTHESIS BI: CPT

## 2023-01-17 PROCEDURE — 77063 BREAST TOMOSYNTHESIS BI: CPT | Performed by: RADIOLOGY

## 2023-01-17 PROCEDURE — 77067 SCR MAMMO BI INCL CAD: CPT | Performed by: RADIOLOGY

## 2023-01-17 PROCEDURE — 77067 SCR MAMMO BI INCL CAD: CPT

## 2023-05-17 ENCOUNTER — OFFICE VISIT (OUTPATIENT)
Dept: CARDIOLOGY | Facility: CLINIC | Age: 58
End: 2023-05-17
Payer: COMMERCIAL

## 2023-05-17 VITALS
HEART RATE: 86 BPM | HEIGHT: 59 IN | DIASTOLIC BLOOD PRESSURE: 74 MMHG | SYSTOLIC BLOOD PRESSURE: 126 MMHG | WEIGHT: 159 LBS | BODY MASS INDEX: 32.05 KG/M2 | OXYGEN SATURATION: 98 %

## 2023-05-17 DIAGNOSIS — G47.33 OBSTRUCTIVE SLEEP APNEA: Primary | ICD-10-CM

## 2023-05-17 RX ORDER — MULTIVIT WITH MINERALS/LUTEIN
250 TABLET ORAL DAILY
COMMUNITY

## 2023-05-17 RX ORDER — VITAMIN E (DL,TOCOPHERYL ACET) 180 MG
CAPSULE ORAL
COMMUNITY

## 2023-05-17 RX ORDER — ZINC SULFATE 50(220)MG
220 CAPSULE ORAL DAILY
COMMUNITY

## 2023-05-17 NOTE — PROGRESS NOTES
Follow-Up Sleep Consult     Date:   2023  Name: Key Webster  :   1965  PCP: Kristina Ramirez APRN    Chief Complaint   Patient presents with   • Sleep Apnea       Subjective     History of Present Illness  Key Webster is a 57 y.o. female who presents today for follow-up on SMILEY.  She presents for annual follow-up on her known history of severe sleep apnea.  She was last seen around 14 months ago.    She reports that since being retired that she is sleeping and resting well.  She reports that her airflow is comfortable.  She reports her device is functioning well.  She reports her DME is helpful for supplies.  She reports that her fullface mask has an air leak near her eyes and this does cause some eye irritation.  She reports she has tried multiple mask including memory foam and DreamWear.  She reports at times her mask can cause a little skin irritation.    In the past she has used a nightguard as she will grind her teeth.    She reports that she continues to exercise.  She is working out with a  once per week.  She reports that she has not lost weight but she has remained stable and not gained.    No specialty comments available.    History of SMILEY with a baseline AHI of 33 on a study 2013.  Last titration study 2013.  She titrated to 9 cm.      Current Treatment: Auto CPAP 8 to 12 cm.  On a DreamStation that has been replaced through the [employer].      Device Download  AHI on download is 2.  Compliance on download is 100%.  When used >4 hours is 100%.   Average use per night is 7 hours 48 minutes.    Current mask used is fullface mask    The patient's relevant past medical, surgical, family, and social history reviewed and updated in Epic as appropriate.    Past Medical History:   Diagnosis Date   • Arthritis     foot, knees    • Diabetes mellitus     ha1c 5.5, controlled with diet and exercise-no longer needs glucophage    •  "Family history of type 2 diabetes mellitus    • Foot pain, right     deep bone bruises-having PT currently    • Herpes, vulvar     lesions   • Hyperlipidemia    • Obstructive sleep apnea     (adult)(pediatric)-cpap compliant    • Screening for colon cancer    • Wears glasses      Past Surgical History:   Procedure Laterality Date   • BREAST BIOPSY Right 2019    Procedure: EXCISIONAL BIOPSY RIGHT BREAST WITH WIRE LOC;  Surgeon: Stan Clement MD;  Location: [facility] OR;  Service: General   • BREAST BIOPSY Right      OB History        2    Para   2    Term   2            AB        Living           SAB        IAB        Ectopic        Molar        Multiple        Live Births                  Allergies   Allergen Reactions   • Femstat [Butoconazole] Hives     Tolerates Monistat   • Tetanus Toxoids Unknown (See Comments)     Childhood reaction      Prior to Admission medications    Medication Sig Start Date End Date Taking? Authorizing Provider   polyethylene glycol (MIRALAX) 17 GM/SCOOP powder Take 17 g by mouth As Needed.   Yes Namita Gambino MD   Probiotic Product (Probiotic Acidophilus) chewable tablet Chew.   Yes Namita Gambino MD   vitamin C (ASCORBIC ACID) 250 MG tablet Take 1 tablet by mouth Daily.   Yes Namita Gambino MD   zinc sulfate (ZINCATE) 220 (50 Zn) MG capsule Take 1 capsule by mouth Daily.   Yes Namita Gambino MD   Omega-3 Fatty Acids (FISH OIL) 1000 MG capsule capsule Take  by mouth Daily With Breakfast.  23  Namita Gambino MD     Family History   Problem Relation Age of Onset   • Breast cancer Mother 55   • Heart attack Father 70        CABGx5   • Diabetes Father         type 2   • Breast cancer Paternal Aunt 70   • Ovarian cancer Neg Hx        Objective     Vital Signs:  /74   Pulse 86   Ht 149.9 cm (59\")   Wt 72.1 kg (159 lb)   SpO2 98%   BMI 32.11 kg/m²     BMI is >= 30 and <35. (Class 1 Obesity). The following options were " offered after discussion;: referral to primary care        Physical Exam  Constitutional:       Appearance: Normal appearance.   HENT:      Head: Normocephalic.   Pulmonary:      Effort: Pulmonary effort is normal.   Musculoskeletal:         General: Normal range of motion.      Cervical back: Neck supple.   Neurological:      General: No focal deficit present.      Mental Status: She is alert and oriented to person, place, and time.   Psychiatric:         Mood and Affect: Mood normal.         Behavior: Behavior normal.         Thought Content: Thought content normal.         The following data was reviewed by: WYATT Quezada on 05/17/2023:    PAP download reviewed: CPAP downloaded dated April 16 through May 15, 2023.  This is a 30-day download.  I have reviewed and interpreted the data on the download.         Assessment and Plan     Diagnoses and all orders for this visit:    1. Obstructive sleep apnea (Primary)  Assessment & Plan:    AHI 33.  This is severe sleep apnea.  She is on CPAP therapy.  Download is reviewed with good control and good compliance.  She is benefiting from PAP therapy and we plan to continue PAP therapy.    Prescription for CPAP supplies to the DME of her choice.    Orders:  -     PAP Therapy      Report if any new/changing symptoms immediately         Follow Up  Return in about 1 year (around 5/17/2024) for SMILEY.  Patient was given instructions and counseling regarding her condition or for health maintenance advice. Please see specific information pulled into the AVS if appropriate.

## 2023-05-17 NOTE — ASSESSMENT & PLAN NOTE
AHI 33.  This is severe sleep apnea.  She is on CPAP therapy.  Download is reviewed with good control and good compliance.  She is benefiting from PAP therapy and we plan to continue PAP therapy.    Prescription for CPAP supplies to the DME of her choice.

## 2023-12-28 ENCOUNTER — TRANSCRIBE ORDERS (OUTPATIENT)
Dept: ADMINISTRATIVE | Facility: HOSPITAL | Age: 58
End: 2023-12-28
Payer: COMMERCIAL

## 2023-12-28 DIAGNOSIS — Z12.31 ENCOUNTER FOR SCREENING MAMMOGRAM FOR BREAST CANCER: Primary | ICD-10-CM

## 2024-02-06 ENCOUNTER — HOSPITAL ENCOUNTER (OUTPATIENT)
Dept: MAMMOGRAPHY | Facility: HOSPITAL | Age: 59
Discharge: HOME | End: 2024-02-06
Admitting: OBSTETRICS & GYNECOLOGY
Payer: COMMERCIAL

## 2024-02-06 DIAGNOSIS — Z12.31 ENCOUNTER FOR SCREENING MAMMOGRAM FOR BREAST CANCER: ICD-10-CM

## 2024-02-06 PROCEDURE — 77063 BREAST TOMOSYNTHESIS BI: CPT

## 2024-02-06 PROCEDURE — 77067 SCR MAMMO BI INCL CAD: CPT

## 2024-02-08 PROCEDURE — 77067 SCR MAMMO BI INCL CAD: CPT | Performed by: RADIOLOGY

## 2024-02-08 PROCEDURE — 77063 BREAST TOMOSYNTHESIS BI: CPT | Performed by: RADIOLOGY

## 2024-05-15 ENCOUNTER — OFFICE VISIT (OUTPATIENT)
Dept: CARDIOLOGY | Facility: CLINIC | Age: 59
End: 2024-05-15
Payer: COMMERCIAL

## 2024-05-15 VITALS
HEIGHT: 59 IN | BODY MASS INDEX: 31.25 KG/M2 | SYSTOLIC BLOOD PRESSURE: 122 MMHG | WEIGHT: 155 LBS | DIASTOLIC BLOOD PRESSURE: 78 MMHG | HEART RATE: 79 BPM | OXYGEN SATURATION: 98 %

## 2024-05-15 DIAGNOSIS — G47.33 OBSTRUCTIVE SLEEP APNEA: ICD-10-CM

## 2024-05-15 DIAGNOSIS — G47.33 OSA (OBSTRUCTIVE SLEEP APNEA): Primary | ICD-10-CM

## 2024-05-15 PROCEDURE — 99213 OFFICE O/P EST LOW 20 MIN: CPT | Performed by: NURSE PRACTITIONER

## 2024-05-15 RX ORDER — UBIDECARENONE 75 MG
50 CAPSULE ORAL DAILY
COMMUNITY

## 2024-05-15 RX ORDER — VIT C/B6/B5/MAGNESIUM/HERB 173 50-5-6-5MG
CAPSULE ORAL
COMMUNITY

## 2024-05-15 RX ORDER — MAGNESIUM OXIDE 400 MG/1
400 TABLET ORAL DAILY
COMMUNITY

## 2024-05-15 RX ORDER — ERGOCALCIFEROL (VITAMIN D2) 10 MCG
400 TABLET ORAL DAILY
COMMUNITY

## 2024-05-15 NOTE — PROGRESS NOTES
Follow-Up Sleep Consult     Date:   05/15/2024  Name: Key Webster  :   1965  PCP: Kristina Ramirez APRN    Chief Complaint   Patient presents with    Sleep Apnea       Subjective     History of Present Illness  Key Webster is a 58 y.o. female who presents today for follow-up on SMILEY.  Today is her annual follow-up visit for her known history of severe sleep apnea on CPAP therapy.    She reports that she has been very busy and under stress as her father has been in rehab stay for the last 2-1/2 months.  He has been discharged home to the care of her and her sister today.    Sleep history:    SMILEY AHI 33 on 2013    Titration study 2013 titrated to CPAP 9 cm    On CPAP therapy: Auto CPAP 8 to 12 cm      Current mask used is fullface mask    Device Functioning Well: Yes-noted that she is on a DreamStation for replacement from [employer] recall.  Noted that her original device is nearing 5 years old.  Mask Fit Comfortable: Yes  Air Flow Comfortable: Yes  DME Helpful for Supplies: Yes  Sleep is rested: Yes, she reports that she cannot sleep without her PAP device.  She has been a faithful user for years.        Device Download:                 The patient's relevant past medical, surgical, family, and social history reviewed and updated in Epic as appropriate.    Past Medical History:   Diagnosis Date    Arthritis     foot, knees     Diabetes mellitus 2016    ha1c 5.5, controlled with diet and exercise-no longer needs glucophage     Family history of type 2 diabetes mellitus     Foot pain, right     deep bone bruises-having PT currently     Herpes, vulvar     lesions    Hyperlipidemia     Obstructive sleep apnea     (adult)(pediatric)-cpap compliant     Screening for colon cancer     Wears glasses      Past Surgical History:   Procedure Laterality Date    BREAST BIOPSY Right 2019    Procedure: EXCISIONAL BIOPSY RIGHT BREAST WITH WIRE LOC;  Surgeon: Stan Clement MD;   "Location: [facility] OR;  Service: General    BREAST BIOPSY Right      OB History          2    Para   2    Term   2            AB        Living             SAB        IAB        Ectopic        Molar        Multiple        Live Births   2              Allergies   Allergen Reactions    Femstat [Butoconazole] Hives     Tolerates Monistat    Tetanus Toxoids Unknown (See Comments)     Childhood reaction      Prior to Admission medications    Medication Sig Start Date End Date Taking? Authorizing Provider   Boswellia-Glucosamine-Vit D (OSTEO BI-FLEX ONE PER DAY PO) Take  by mouth.   Yes Namita Gambino MD   magnesium oxide (MAG-OX) 400 MG tablet Take 1 tablet by mouth Daily.   Yes Namita Gambino MD   polyethylene glycol (MIRALAX) 17 GM/SCOOP powder Take 17 g by mouth As Needed.   Yes Namita Gambino MD   Probiotic Product (Probiotic Acidophilus) chewable tablet Chew.   Yes Namita Gambino MD   Turmeric (QC Tumeric Complex) 500 MG capsule Take  by mouth.   Yes Namita Gambino MD   vitamin B-12 (CYANOCOBALAMIN) 100 MCG tablet Take 0.5 tablets by mouth Daily.   Yes Namita Gambino MD   vitamin C (ASCORBIC ACID) 250 MG tablet Take 1 tablet by mouth Daily.   Yes Namita Gambino MD   Vitamin D, Cholecalciferol, (CHOLECALCIFEROL) 10 MCG (400 UNIT) tablet Take 1 tablet by mouth Daily.   Yes Namita Gambino MD   zinc sulfate (ZINCATE) 220 (50 Zn) MG capsule Take 1 capsule by mouth Daily.   Yes Namita Gambino MD     Family History   Problem Relation Age of Onset    Breast cancer Mother 55    Heart attack Father 70        CABGx5    Diabetes Father         type 2    Breast cancer Paternal Aunt 70    Ovarian cancer Neg Hx        Objective     Vital Signs:  /78   Pulse 79   Ht 149.9 cm (59\")   Wt 70.3 kg (155 lb)   SpO2 98%   BMI 31.31 kg/m²              Physical Exam  HENT:      Head: Normocephalic.      Nose: Nose normal.      Mouth/Throat:      Mouth: " Mucous membranes are moist.   Pulmonary:      Effort: Pulmonary effort is normal.   Skin:     General: Skin is warm and dry.   Neurological:      Mental Status: She is alert and oriented to person, place, and time.   Psychiatric:         Mood and Affect: Mood normal.         Behavior: Behavior normal.         Thought Content: Thought content normal.         The following data was reviewed by: WYATT Quezada on 05/15/2024:    PAP download reviewed: 30-day download as above.  I have reviewed and interpreted the data on the download in today's clinic.         Assessment and Plan     Diagnoses and all orders for this visit:    1. SMILEY (obstructive sleep apnea) (Primary)  Assessment & Plan:  Baseline AHI is 33.  This is severe sleep apnea.    She is on CPAP therapy.  Download reviewed with good control and good compliance.    She is benefiting from PAP therapy and we plan to continue PAP therapy.    Prescription to DME of patient's choice for her CPAP supplies.    Noted that her original device is nearing 5 years of age.  So we will plan a 6-month follow-up and give consideration to replacing her CPAP.  She has to follow-up sooner for any SMILEY or CPAP concerns.        Orders:  -     PAP Therapy    2. Obstructive sleep apnea  Assessment & Plan:  Baseline AHI is 33.  This is severe sleep apnea.    She is on CPAP therapy.  Download reviewed with good control and good compliance.    She is benefiting from PAP therapy and we plan to continue PAP therapy.    Prescription to DME of patient's choice for her CPAP supplies.    Noted that her original device is nearing 5 years of age.  So we will plan a 6-month follow-up and give consideration to replacing her CPAP.  She has to follow-up sooner for any SMILEY or CPAP concerns.              Report if any new/changing symptoms immediately         Follow Up  Return in about 6 months (around 11/15/2024) for SMILEY/consider a new device .  Patient was given instructions and  counseling regarding her condition or for health maintenance advice. Please see specific information pulled into the AVS if appropriate.

## 2024-05-15 NOTE — ASSESSMENT & PLAN NOTE
Baseline AHI is 33.  This is severe sleep apnea.    She is on CPAP therapy.  Download reviewed with good control and good compliance.    She is benefiting from PAP therapy and we plan to continue PAP therapy.    Prescription to DME of patient's choice for her CPAP supplies.    Noted that her original device is nearing 5 years of age.  So we will plan a 6-month follow-up and give consideration to replacing her CPAP.  She has to follow-up sooner for any SMILEY or CPAP concerns.

## 2024-11-13 ENCOUNTER — OFFICE VISIT (OUTPATIENT)
Dept: CARDIOLOGY | Facility: CLINIC | Age: 59
End: 2024-11-13
Payer: COMMERCIAL

## 2024-11-13 VITALS
SYSTOLIC BLOOD PRESSURE: 124 MMHG | HEART RATE: 89 BPM | OXYGEN SATURATION: 99 % | HEIGHT: 59 IN | DIASTOLIC BLOOD PRESSURE: 64 MMHG | BODY MASS INDEX: 31.04 KG/M2 | WEIGHT: 154 LBS

## 2024-11-13 DIAGNOSIS — G47.33 OSA (OBSTRUCTIVE SLEEP APNEA): Primary | ICD-10-CM

## 2024-11-13 PROCEDURE — 99213 OFFICE O/P EST LOW 20 MIN: CPT | Performed by: NURSE PRACTITIONER

## 2024-11-13 NOTE — ASSESSMENT & PLAN NOTE
She has a known history of severe sleep apnea.  Baseline AHI is 33.    She is on CPAP therapy.  Download reviewed with good control and good compliance.    She is benefiting from PAP therapy.    We plan to continue PAP therapy.    Noted that her original device is greater than 5 years old.  We discussed and she wishes to have this replaced.    Prescription to DME of patient's choice for new auto CPAP 8 to 12 cm and CPAP supplies.    Follow-up on new CPAP when she has had the device more than 1 month but less than 3 months.  We will plan to review a download for control and compliance.

## 2024-11-13 NOTE — PROGRESS NOTES
Follow-Up Sleep Consult     Date:   2024  Name: Key Webster  :   1965  PCP: Kristina Ramirez APRN    Chief Complaint   Patient presents with    Sleep Apnea       Subjective     History of Present Illness  Key Webster is a 59 y.o. female who presents today for follow-up on SMILEY.  She comes in for scheduled 6-month follow-up visit on her known history of sleep apnea.    Noted that her PAP device is now greater than 5 years old.  We discussed this and she does wish to have it replaced.      Sleep history:    SMILEY AHI 33 on 2013    Titration study 2013 titrated to CPAP 9 cm    On CPAP therapy: Auto CPAP 8 to 12 cm      Current mask used is fullface mask    Device Functioning Well: No, Device is greater than 5 years old   Mask Fit Comfortable: Yes  Air Flow Comfortable: Yes  DME Helpful for Supplies: Yes  Sleep is rested: Yes    Device Download:                 The patient's relevant past medical, surgical, family, and social history reviewed and updated in Epic as appropriate.    Past Medical History:   Diagnosis Date    Arthritis     foot, knees     Diabetes mellitus 2016    ha1c 5.5, controlled with diet and exercise-no longer needs glucophage     Family history of type 2 diabetes mellitus     Foot pain, right     deep bone bruises-having PT currently     Herpes, vulvar     lesions    Hyperlipidemia     Obstructive sleep apnea     (adult)(pediatric)-cpap compliant     Screening for colon cancer     Wears glasses      Past Surgical History:   Procedure Laterality Date    BREAST BIOPSY Right 2019    Procedure: EXCISIONAL BIOPSY RIGHT BREAST WITH WIRE LOC;  Surgeon: Stan Clement MD;  Location: [facility] OR;  Service: General    BREAST BIOPSY Right      OB History          2    Para   2    Term   2            AB        Living             SAB        IAB        Ectopic        Molar        Multiple        Live Births   2              Allergies   Allergen Reactions  "   Femstat [Butoconazole] Hives     Tolerates Monistat    Tetanus Toxoids Unknown (See Comments)     Childhood reaction      Prior to Admission medications    Medication Sig Start Date End Date Taking? Authorizing Provider   Boswellia-Glucosamine-Vit D (OSTEO BI-FLEX ONE PER DAY PO) Take  by mouth.   Yes Namita Gambino MD   magnesium oxide (MAG-OX) 400 MG tablet Take 1 tablet by mouth Daily.   Yes Namita Gambino MD   Probiotic Product (Probiotic Acidophilus) chewable tablet Chew.   Yes Namita Gambino MD   Turmeric (QC Tumeric Complex) 500 MG capsule Take  by mouth.   Yes Namita Gambino MD   vitamin B-12 (CYANOCOBALAMIN) 100 MCG tablet Take 0.5 tablets by mouth Daily.   Yes Namita Gambino MD   vitamin C (ASCORBIC ACID) 250 MG tablet Take 1 tablet by mouth Daily.   Yes Namita Gambino MD   Vitamin D, Cholecalciferol, (CHOLECALCIFEROL) 10 MCG (400 UNIT) tablet Take 1 tablet by mouth Daily.   Yes Namita Gambino MD   zinc sulfate (ZINCATE) 220 (50 Zn) MG capsule Take 1 capsule by mouth Daily.   Yes Namita Gambino MD   polyethylene glycol (MIRALAX) 17 GM/SCOOP powder Take 17 g by mouth As Needed.  11/13/24  Namita Gambino MD     Family History   Problem Relation Age of Onset    Breast cancer Mother 55    Heart attack Father 70        CABGx5    Diabetes Father         type 2    Breast cancer Paternal Aunt 70    Ovarian cancer Neg Hx        Objective     Vital Signs:  /64   Pulse 89   Ht 149.9 cm (59\")   Wt 69.9 kg (154 lb)   SpO2 99%   BMI 31.10 kg/m²     BMI is >= 30 and <35. (Class 1 Obesity). The following options were offered after discussion;: referral to primary care        Physical Exam  HENT:      Head: Normocephalic.      Nose: Nose normal.      Mouth/Throat:      Mouth: Mucous membranes are moist.   Pulmonary:      Effort: Pulmonary effort is normal.   Skin:     General: Skin is warm and dry.   Neurological:      Mental Status: She is alert " and oriented to person, place, and time.   Psychiatric:         Mood and Affect: Mood normal.         Behavior: Behavior normal.         Thought Content: Thought content normal.         The following data was reviewed by: WYATT Quezada on 11/13/2024:    PAP download reviewed: 30-day download as above.  I have reviewed and interpreted the data on the download at today's visit.         Assessment and Plan     Diagnoses and all orders for this visit:    1. SMILEY (obstructive sleep apnea) (Primary)  Assessment & Plan:  She has a known history of severe sleep apnea.  Baseline AHI is 33.    She is on CPAP therapy.  Download reviewed with good control and good compliance.    She is benefiting from PAP therapy.    We plan to continue PAP therapy.    Noted that her original device is greater than 5 years old.  We discussed and she wishes to have this replaced.    Prescription to DME of patient's choice for new auto CPAP 8 to 12 cm and CPAP supplies.    Follow-up on new CPAP when she has had the device more than 1 month but less than 3 months.  We will plan to review a download for control and compliance.    Orders:  -     PAP Therapy        Report if any new/changing symptoms immediately         Follow Up  Return in about 3 months (around 2/13/2025) for SMILEY/ new cpap 31-90 days .  Patient was given instructions and counseling regarding her condition or for health maintenance advice. Please see specific information pulled into the AVS if appropriate.

## 2024-12-30 ENCOUNTER — TRANSCRIBE ORDERS (OUTPATIENT)
Dept: ADMINISTRATIVE | Facility: HOSPITAL | Age: 59
End: 2024-12-30
Payer: COMMERCIAL

## 2024-12-30 DIAGNOSIS — Z12.31 SCREENING MAMMOGRAM FOR BREAST CANCER: Primary | ICD-10-CM

## 2025-01-23 LAB
NCCN CRITERIA FLAG: ABNORMAL
TYRER CUZICK SCORE: 20.4

## 2025-02-07 ENCOUNTER — HOSPITAL ENCOUNTER (OUTPATIENT)
Dept: MAMMOGRAPHY | Facility: HOSPITAL | Age: 60
Discharge: HOME | End: 2025-02-07
Admitting: OBSTETRICS & GYNECOLOGY
Payer: COMMERCIAL

## 2025-02-07 DIAGNOSIS — Z12.31 SCREENING MAMMOGRAM FOR BREAST CANCER: ICD-10-CM

## 2025-02-07 PROCEDURE — 77063 BREAST TOMOSYNTHESIS BI: CPT

## 2025-02-07 PROCEDURE — 77067 SCR MAMMO BI INCL CAD: CPT

## 2025-02-13 ENCOUNTER — DOCUMENTATION (OUTPATIENT)
Dept: GENETICS | Facility: HOSPITAL | Age: 60
End: 2025-02-13
Payer: COMMERCIAL

## 2025-02-13 NOTE — PROGRESS NOTES
Meets NCCN Criteria for Genetic Testing  Declines genetic testing?     Reason for decline?     If Reason for Decline is Previous Testing    [facility] CARE     Non-CARE     Non-CARE Confirmation    Navigator Confirmed?     Patient Reported?     Elevated Tyrer-Cuzick Score ? Or Equal to 20%    Declines referral? Y   Reason for decline? Personal Choice

## 2025-03-18 ENCOUNTER — OFFICE VISIT (OUTPATIENT)
Dept: CARDIOLOGY | Facility: CLINIC | Age: 60
End: 2025-03-18
Payer: COMMERCIAL

## 2025-03-18 VITALS
HEIGHT: 59 IN | SYSTOLIC BLOOD PRESSURE: 130 MMHG | WEIGHT: 154 LBS | HEART RATE: 76 BPM | BODY MASS INDEX: 31.04 KG/M2 | OXYGEN SATURATION: 98 % | DIASTOLIC BLOOD PRESSURE: 72 MMHG

## 2025-03-18 DIAGNOSIS — G47.33 OSA (OBSTRUCTIVE SLEEP APNEA): Primary | ICD-10-CM

## 2025-03-18 PROCEDURE — 99213 OFFICE O/P EST LOW 20 MIN: CPT | Performed by: NURSE PRACTITIONER

## 2025-03-18 NOTE — ASSESSMENT & PLAN NOTE
Baseline AHI of 33.  This is severe sleep apnea.    She is on a new CPAP device.  She has been on it more than 1 month but less than 3 months.    Download is reviewed with good control and good compliance.    She is satisfied with the new device.    She is benefiting from PAP therapy.    We plan to continue PAP therapy.    She has a current prescription at the DME of her choice for her PAP supplies.    Plan follow-up for severe SMILEY on CPAP therapy in 8 months or sooner for any SMILEY or PAP concerns.

## 2025-03-18 NOTE — PROGRESS NOTES
Follow-Up Sleep Consult     Date:   2025  Name: Key Webster  :   1965  PCP: Kristina Ramirez APRN    Chief Complaint   Patient presents with    Sleep Apnea       Subjective     History of Present Illness  Key Webster is a 59 y.o. female who presents today for follow-up on SMILEY.    She comes in today for follow-up on her new CPAP.  She reports that she started a new CPAP in January so she has had the device more than 1 month but less than 3 months.    She is satisfied with the new device.    Sleep history:    SMILEY AHI 33 on 2013    Titration study 2013 titrated to CPAP 9 cm    On CPAP therapy: Auto CPAP 8 to 12 cm      Current mask used is FFM    Device Functioning Well: Yes  Mask Fit Comfortable: Yes  Air Flow Comfortable: Yes  DME Helpful for Supplies: Yes  Sleep is rested: Yes    Device Download:                         The patient's relevant past medical, surgical, family, and social history reviewed and updated in Epic as appropriate.    Past Medical History:   Diagnosis Date    Arthritis     foot, knees     Diabetes mellitus     ha1c 5.5, controlled with diet and exercise-no longer needs glucophage     Family history of type 2 diabetes mellitus     Foot pain, right     deep bone bruises-having PT currently     Herpes, vulvar     lesions    Hyperlipidemia     Obstructive sleep apnea     (adult)(pediatric)-cpap compliant     Screening for colon cancer     Wears glasses      Past Surgical History:   Procedure Laterality Date    BREAST BIOPSY Right 2019    Procedure: EXCISIONAL BIOPSY RIGHT BREAST WITH WIRE LOC;  Surgeon: Stan Celment MD;  Location: [facility];  Service: General    BREAST BIOPSY Right      OB History          2    Para   2    Term   2            AB        Living             SAB        IAB        Ectopic        Molar        Multiple        Live Births   2              Allergies   Allergen Reactions    Femstat [Butoconazole] Hives     " Tolerates Monistat    Tetanus Toxoids Unknown (See Comments)     Childhood reaction      Prior to Admission medications    Medication Sig Start Date End Date Taking? Authorizing Provider   Boswellia-Glucosamine-Vit D (OSTEO BI-FLEX ONE PER DAY PO) Take  by mouth.   Yes Namita Gambino MD   Cinnamon 500 MG tablet Take  by mouth.   Yes Namita Gambino MD   magnesium oxide (MAG-OX) 400 MG tablet Take 1 tablet by mouth Daily.   Yes Namita Gambino MD   Probiotic Product (Probiotic Acidophilus) chewable tablet Chew.   Yes Namita Gambino MD   Turmeric (QC Tumeric Complex) 500 MG capsule Take  by mouth.   Yes Namita Gambino MD   vitamin B-12 (CYANOCOBALAMIN) 100 MCG tablet Take 0.5 tablets by mouth Daily.   Yes Namita Gambino MD   vitamin C (ASCORBIC ACID) 250 MG tablet Take 1 tablet by mouth Daily.   Yes Namita Gambino MD   Vitamin D, Cholecalciferol, (CHOLECALCIFEROL) 10 MCG (400 UNIT) tablet Take 1 tablet by mouth Daily.   Yes Namita Gambino MD   zinc sulfate (ZINCATE) 220 (50 Zn) MG capsule Take 1 capsule by mouth Daily.   Yes Namita Gambino MD     Family History   Problem Relation Age of Onset    Breast cancer Mother 55    Heart attack Father 70        CABGx5    Diabetes Father         type 2    Breast cancer Paternal Aunt 70    Ovarian cancer Neg Hx        Objective     Vital Signs:  /72 (BP Location: Right arm, Patient Position: Sitting, Cuff Size: Adult)   Pulse 76   Ht 149.9 cm (59\")   Wt 69.9 kg (154 lb)   SpO2 98%   BMI 31.10 kg/m²              Physical Exam  HENT:      Head: Normocephalic.      Nose: Nose normal.      Mouth/Throat:      Mouth: Mucous membranes are moist.   Pulmonary:      Effort: Pulmonary effort is normal.   Skin:     General: Skin is warm and dry.   Neurological:      Mental Status: She is alert and oriented to person, place, and time.   Psychiatric:         Mood and Affect: Mood normal.         Behavior: Behavior normal. "         Thought Content: Thought content normal.         The following data was reviewed by: WYATT Quezada on 03/18/2025:    PAP download reviewed: 30-day download as above.  I have reviewed and interpreted the data on the download at today's visit.         Assessment and Plan     Diagnoses and all orders for this visit:    1. SMILEY (obstructive sleep apnea) (Primary)  Assessment & Plan:  Baseline AHI of 33.  This is severe sleep apnea.    She is on a new CPAP device.  She has been on it more than 1 month but less than 3 months.    Download is reviewed with good control and good compliance.    She is satisfied with the new device.    She is benefiting from PAP therapy.    We plan to continue PAP therapy.    She has a current prescription at the DME of her choice for her PAP supplies.    Plan follow-up for severe SMILEY on CPAP therapy in 8 months or sooner for any SMILEY or PAP concerns.          Report if any new/changing symptoms immediately         Follow Up  Return in about 8 months (around 11/18/2025) for SMILEY.  Patient was given instructions and counseling regarding her condition or for health maintenance advice. Please see specific information pulled into the AVS if appropriate.

## (undated) DEVICE — ELECTRD NDL EDGE/INSUL/PFTE.787MM 2.84IN

## (undated) DEVICE — DRSNG SURESITE WNDW 4X4.5

## (undated) DEVICE — ANTIBACTERIAL UNDYED BRAIDED (POLYGLACTIN 910), SYNTHETIC ABSORBABLE SUTURE: Brand: COATED VICRYL

## (undated) DEVICE — DEV TRANSPEC W/PERF COMP PLT

## (undated) DEVICE — PK MINOR SPLT 10

## (undated) DEVICE — APPL CHLORAPREP W/TINT 26ML BLU

## (undated) DEVICE — COVER,LIGHT HANDLE,FLX,1/PK: Brand: MEDLINE INDUSTRIES, INC.

## (undated) DEVICE — GLV SURG TRIUMPH ORTHO W/ALOE PF LTX 7.5 STRL

## (undated) DEVICE — SUT MNCRYL PLS ANTIB UD 4/0 PS2 18IN